# Patient Record
Sex: FEMALE | URBAN - METROPOLITAN AREA
[De-identification: names, ages, dates, MRNs, and addresses within clinical notes are randomized per-mention and may not be internally consistent; named-entity substitution may affect disease eponyms.]

---

## 2022-08-30 ENCOUNTER — APPOINTMENT (OUTPATIENT)
Dept: URBAN - METROPOLITAN AREA CLINIC 211 | Age: 37
Setting detail: DERMATOLOGY
End: 2022-09-01

## 2022-08-30 DIAGNOSIS — L98.8 OTHER SPECIFIED DISORDERS OF THE SKIN AND SUBCUTANEOUS TISSUE: ICD-10-CM

## 2022-08-30 PROCEDURE — OTHER BOTOX: OTHER

## 2022-08-30 PROCEDURE — OTHER MIPS QUALITY: OTHER

## 2022-08-30 PROCEDURE — OTHER COUNSELING: OTHER

## 2022-08-30 ASSESSMENT — LOCATION ZONE DERM: LOCATION ZONE: FACE

## 2022-08-30 ASSESSMENT — LOCATION SIMPLE DESCRIPTION DERM: LOCATION SIMPLE: INFERIOR FOREHEAD

## 2022-08-30 ASSESSMENT — LOCATION DETAILED DESCRIPTION DERM: LOCATION DETAILED: INFERIOR MID FOREHEAD

## 2022-08-30 NOTE — HPI: COSMETIC (BOTOX)
Have You Had Botox Before?: has had botox
Additional History: Pain level 0/10
When Was Your Last Botox Treatment?: 5 months ago with another provider
done

## 2022-08-30 NOTE — PROCEDURE: BOTOX
Patient Specific Comments (Will Not Stick From Patient To Patient): risk of abnormal smile that may appear stroke-like from treating RC's is discussed.  Pt expresses understanding and consents to tx for the area.

## 2023-01-02 ENCOUNTER — APPOINTMENT (OUTPATIENT)
Dept: URBAN - METROPOLITAN AREA CLINIC 211 | Age: 38
Setting detail: DERMATOLOGY
End: 2023-01-02

## 2023-01-02 DIAGNOSIS — L98.8 OTHER SPECIFIED DISORDERS OF THE SKIN AND SUBCUTANEOUS TISSUE: ICD-10-CM

## 2023-01-02 PROCEDURE — OTHER PHOTO-DOCUMENTATION: OTHER

## 2023-01-02 PROCEDURE — OTHER COUNSELING: OTHER

## 2023-01-02 PROCEDURE — OTHER BOTOX: OTHER

## 2023-01-02 PROCEDURE — OTHER MIPS QUALITY: OTHER

## 2023-01-02 NOTE — HPI: COSMETIC (BOTOX)
Have You Had Botox Before?: has had botox
Additional History: Pain 0/10
When Was Your Last Botox Treatment?: 08/2022

## 2023-01-02 NOTE — PROCEDURE: BOTOX
Mentalis Units: 0
Additional Area 1 Units: 34
Show Right And Left Brow Units: No
Additional Area 1 Location: Forehead, glabella and Crowsfeet
Show Depressor Anguli Units: Yes
Consent: Written consent obtained. Risks include but not limited to lid/brow ptosis, bruising, swelling, diplopia, temporary effect, incomplete chemical denervation.
Detail Level: Detailed
Post-Care Instructions: Patient instructed to not lie down for 4 hours and limit physical activity for 24 hours.
Dilution (U/0.1 Cc): 4
Show Inventory Tab: Show

## 2023-03-02 ENCOUNTER — APPOINTMENT (OUTPATIENT)
Dept: URBAN - METROPOLITAN AREA CLINIC 211 | Age: 38
Setting detail: DERMATOLOGY
End: 2023-03-02

## 2023-03-02 DIAGNOSIS — L98.8 OTHER SPECIFIED DISORDERS OF THE SKIN AND SUBCUTANEOUS TISSUE: ICD-10-CM

## 2023-03-02 PROCEDURE — OTHER COUNSELING: OTHER

## 2023-03-02 PROCEDURE — OTHER MIPS QUALITY: OTHER

## 2023-03-02 PROCEDURE — OTHER BOTOX: OTHER

## 2023-03-02 ASSESSMENT — LOCATION SIMPLE DESCRIPTION DERM: LOCATION SIMPLE: INFERIOR FOREHEAD

## 2023-03-02 ASSESSMENT — LOCATION ZONE DERM: LOCATION ZONE: FACE

## 2023-03-02 ASSESSMENT — LOCATION DETAILED DESCRIPTION DERM: LOCATION DETAILED: INFERIOR MID FOREHEAD

## 2023-03-02 NOTE — HPI: COSMETIC (BOTOX)
Have You Had Botox Before?: has had botox
Additional History: Patient states the botox does not last. She would like a lip flip
When Was Your Last Botox Treatment?: 01/23

## 2023-03-02 NOTE — PROCEDURE: BOTOX
Show Glabellar Units: Yes
R Brow Units: 0
Show Mentalis Units: No
Show Inventory Tab: Show
Incrementing Botox Units: By 0.5 Units
Additional Area 1 Location: CF/Upper lip/lateral brows
Consent: Written consent obtained. Risks include but not limited to lid/brow ptosis, bruising, swelling, diplopia, temporary effect, incomplete chemical denervation.
Post-Care Instructions: Patient instructed to not lie down for 4 hours and limit physical activity for 24 hours.
Additional Area 1 Units: 20
Detail Level: Zone
Dilution (U/0.1 Cc): 4

## 2023-05-18 ENCOUNTER — APPOINTMENT (OUTPATIENT)
Dept: URBAN - METROPOLITAN AREA CLINIC 211 | Age: 38
Setting detail: DERMATOLOGY
End: 2023-05-22

## 2023-05-18 DIAGNOSIS — L98.8 OTHER SPECIFIED DISORDERS OF THE SKIN AND SUBCUTANEOUS TISSUE: ICD-10-CM

## 2023-05-18 DIAGNOSIS — Z41.9 ENCOUNTER FOR PROCEDURE FOR PURPOSES OTHER THAN REMEDYING HEALTH STATE, UNSPECIFIED: ICD-10-CM

## 2023-05-18 PROCEDURE — OTHER COSMETIC QUOTE: OTHER

## 2023-05-18 PROCEDURE — OTHER COSMETIC CONSULTATION: IPL: OTHER

## 2023-05-18 PROCEDURE — OTHER COUNSELING: OTHER

## 2023-05-18 PROCEDURE — OTHER BOTOX: OTHER

## 2023-05-18 PROCEDURE — OTHER MIPS QUALITY: OTHER

## 2023-05-18 ASSESSMENT — LOCATION DETAILED DESCRIPTION DERM
LOCATION DETAILED: LEFT MEDIAL FOREHEAD
LOCATION DETAILED: INFERIOR MID FOREHEAD

## 2023-05-18 ASSESSMENT — LOCATION SIMPLE DESCRIPTION DERM
LOCATION SIMPLE: INFERIOR FOREHEAD
LOCATION SIMPLE: LEFT FOREHEAD

## 2023-05-18 ASSESSMENT — LOCATION ZONE DERM: LOCATION ZONE: FACE

## 2023-05-18 NOTE — PROCEDURE: COSMETIC QUOTE
Detail Level: Simple
Dysport Price Per Unit: 15
Voluma Price Per Syringe: 500
Discount Percentage: 0
Notice: We have created a more complete Cosmetic Quote plan.  The procedure name is also Cosmetic Quote.  Please review the new plan and hide the Cosmetic Quote plan you do not want to use.

## 2023-10-10 ENCOUNTER — APPOINTMENT (OUTPATIENT)
Dept: URBAN - METROPOLITAN AREA CLINIC 211 | Age: 38
Setting detail: DERMATOLOGY
End: 2023-10-10

## 2023-10-10 DIAGNOSIS — L98.8 OTHER SPECIFIED DISORDERS OF THE SKIN AND SUBCUTANEOUS TISSUE: ICD-10-CM

## 2023-10-10 PROCEDURE — OTHER MIPS QUALITY: OTHER

## 2023-10-10 PROCEDURE — OTHER BOTOX: OTHER

## 2023-10-10 PROCEDURE — OTHER COUNSELING: OTHER

## 2023-10-10 NOTE — PROCEDURE: BOTOX
Patient Specific Comments (Will Not Stick From Patient To Patient): Reviewed expectations and what is achievable with neurotoxin. Reviewed risks. Do not think would get much benefit from undereye treatment.